# Patient Record
Sex: FEMALE
[De-identification: names, ages, dates, MRNs, and addresses within clinical notes are randomized per-mention and may not be internally consistent; named-entity substitution may affect disease eponyms.]

---

## 2018-04-30 ENCOUNTER — HOSPITAL ENCOUNTER (OUTPATIENT)
Dept: HOSPITAL 5 - SPVWC | Age: 73
Discharge: HOME | End: 2018-04-30
Attending: INTERNAL MEDICINE
Payer: MEDICARE

## 2018-04-30 DIAGNOSIS — Z12.31: Primary | ICD-10-CM

## 2018-04-30 PROCEDURE — 77067 SCR MAMMO BI INCL CAD: CPT

## 2018-05-01 NOTE — MAMMOGRAPHY REPORT
BILATERAL DIGITAL SCREENING MAMMOGRAM with CAD: 04/30/18 09:32:00



CLINICAL: Routine screening.



COMPARISON:10/26/16



FINDINGS: The breasts are almost entirely fatty. No mass, architectural 

distortion or suspicious calcifications.



IMPRESSION: No mammographic evidence of malignancy.



BI-RADS CATEGORY: 1 - - Negative



RECOMMENDATION: Routine mammographic screening in one year.





COMMENT:

Patient follow-up letters are generated by our [employer] application.